# Patient Record
Sex: FEMALE | Race: BLACK OR AFRICAN AMERICAN | NOT HISPANIC OR LATINO
[De-identification: names, ages, dates, MRNs, and addresses within clinical notes are randomized per-mention and may not be internally consistent; named-entity substitution may affect disease eponyms.]

---

## 2021-10-08 PROBLEM — Z00.00 ENCOUNTER FOR PREVENTIVE HEALTH EXAMINATION: Status: ACTIVE | Noted: 2021-10-08

## 2021-10-12 ENCOUNTER — APPOINTMENT (OUTPATIENT)
Dept: INFECTIOUS DISEASE | Facility: CLINIC | Age: 36
End: 2021-10-12
Payer: MEDICAID

## 2021-10-12 ENCOUNTER — LABORATORY RESULT (OUTPATIENT)
Age: 36
End: 2021-10-12

## 2021-10-12 VITALS
TEMPERATURE: 97.6 F | HEART RATE: 79 BPM | HEIGHT: 64 IN | SYSTOLIC BLOOD PRESSURE: 119 MMHG | WEIGHT: 154.38 LBS | OXYGEN SATURATION: 98 % | BODY MASS INDEX: 26.36 KG/M2 | DIASTOLIC BLOOD PRESSURE: 80 MMHG

## 2021-10-12 DIAGNOSIS — Z86.2 PERSONAL HISTORY OF DISEASES OF THE BLOOD AND BLOOD-FORMING ORGANS AND CERTAIN DISORDERS INVOLVING THE IMMUNE MECHANISM: ICD-10-CM

## 2021-10-12 PROCEDURE — 99203 OFFICE O/P NEW LOW 30 MIN: CPT

## 2021-10-12 RX ORDER — VITAMIN E ACETATE 670 MG
CAPSULE ORAL
Refills: 0 | Status: ACTIVE | COMMUNITY

## 2021-10-12 NOTE — DATA REVIEWED
[FreeTextEntry1] : 9/1/21 lab notable for\par GC chlamydia neg\par HBsAg+\par HCV ab neg\par RPR neg\par HIV neg \par HSV1 IgG pos\par HSV2 IgG pos\par \par Referred by GYN Dr. Paola Herbert ([phone])\par

## 2021-10-12 NOTE — PHYSICAL EXAM
[General Appearance - Alert] : alert [General Appearance - In No Acute Distress] : in no acute distress [Sclera] : the sclera and conjunctiva were normal [Outer Ear] : the ears and nose were normal in appearance [Neck Appearance] : the appearance of the neck was normal [Auscultation Breath Sounds / Voice Sounds] : lungs were clear to auscultation bilaterally [Heart Rate And Rhythm] : heart rate was normal and rhythm regular [Heart Sounds] : normal S1 and S2 [Abdomen Soft] : soft [] : no hepato-splenomegaly [Skin Color & Pigmentation] : normal skin color and pigmentation [No Focal Deficits] : no focal deficits [Oriented To Time, Place, And Person] : oriented to person, place, and time

## 2021-10-12 NOTE — HISTORY OF PRESENT ILLNESS
[Monogamous] : monogamous [FreeTextEntry1] : 35F h/o NELA p/w initial evaluation for hepatitis B infection.\par \par She was tested positive for hepatitis B in 2011 when she was pregnant.  She didn't get much explanation what to do afterword and wasn't referred to anyone.  She didn't think the result is significant and forgot about it.  She does remember that her baby was vaccinated right after the birth to get protected.   She went to see GYN Dr. Paola Herbert for routine screening in 9/2021 and was told that she has hepatitis B infection and was referred to see ID.  Currently she is asymptomatic. \par \par Of note, she has L ear infection and is taking ear drop abx.   [Sexually Active] : The patient is not sexually active [Condom Use] : not using condoms [de-identified] : last sexual activity was in 2019.  Had monogomous relationship (male partner) for 8 years which ended in 2019.   [de-identified] : none [de-identified] : Born in [facility], moved to [facility] at age 6, then moved to the [place] at age 13.  Since then she was at foster care, doesn't know anything about her biological parents   [de-identified] :  aid  [de-identified] : none now [de-identified] : daughter (10 yo)

## 2021-10-13 LAB
ALBUMIN SERPL ELPH-MCNC: 4.9 G/DL
ALP BLD-CCNC: 55 U/L
ALT SERPL-CCNC: 12 U/L
ANION GAP SERPL CALC-SCNC: 14 MMOL/L
AST SERPL-CCNC: 16 U/L
BILIRUB SERPL-MCNC: 0.4 MG/DL
BUN SERPL-MCNC: 7 MG/DL
CALCIUM SERPL-MCNC: 10.2 MG/DL
CHLORIDE SERPL-SCNC: 103 MMOL/L
CO2 SERPL-SCNC: 24 MMOL/L
CREAT SERPL-MCNC: 0.8 MG/DL
FERRITIN SERPL-MCNC: 42 NG/ML
GLUCOSE SERPL-MCNC: 94 MG/DL
HBV E AB SER QL: REACTIVE
HBV E AG SER QL: NONREACTIVE
HIV1+2 AB SPEC QL IA.RAPID: NONREACTIVE
INR PPP: 1.06 RATIO
IRON SATN MFR SERPL: 8 %
IRON SERPL-MCNC: 28 UG/DL
POTASSIUM SERPL-SCNC: 3.8 MMOL/L
PROT SERPL-MCNC: 8 G/DL
PT BLD: 12.6 SEC
SODIUM SERPL-SCNC: 141 MMOL/L
TIBC SERPL-MCNC: 355 UG/DL
UIBC SERPL-MCNC: 327 UG/DL

## 2021-10-14 LAB
BASOPHILS # BLD AUTO: 0.01 K/UL
BASOPHILS NFR BLD AUTO: 0.4 %
EOSINOPHIL # BLD AUTO: 0.01 K/UL
EOSINOPHIL NFR BLD AUTO: 0.4 %
HAV IGM SER QL: NONREACTIVE
HBV CORE IGG+IGM SER QL: REACTIVE
HBV CORE IGM SER QL: NONREACTIVE
HBV DNA # SERPL NAA+PROBE: 201 IU/ML
HBV SURFACE AB SER QL: ABNORMAL
HBV SURFACE AB SERPL IA-ACNC: 9.1 MIU/ML
HBV SURFACE AG SER QL: REACTIVE
HCT VFR BLD CALC: 40 %
HCV AB SER QL: NONREACTIVE
HCV S/CO RATIO: 0.16 S/CO
HEPATITIS A IGG ANTIBODY: REACTIVE
HEPB DNA PCR LOG: 2.3 LOG10IU/ML
HGB BLD-MCNC: 12.8 G/DL
IMM GRANULOCYTES NFR BLD AUTO: 0 %
LYMPHOCYTES # BLD AUTO: 1.11 K/UL
LYMPHOCYTES NFR BLD AUTO: 41.3 %
MAN DIFF?: NORMAL
MCHC RBC-ENTMCNC: 27.3 PG
MCHC RBC-ENTMCNC: 32 GM/DL
MCV RBC AUTO: 85.3 FL
MONOCYTES # BLD AUTO: 0.28 K/UL
MONOCYTES NFR BLD AUTO: 10.4 %
NEUTROPHILS # BLD AUTO: 1.28 K/UL
NEUTROPHILS NFR BLD AUTO: 47.5 %
PLATELET # BLD AUTO: 204 K/UL
RBC # BLD: 4.69 M/UL
RBC # FLD: 14.6 %
WBC # FLD AUTO: 2.69 K/UL

## 2021-10-15 LAB
FIBROSIS SCORE: 0.1
FIBROSIS STAGE: NORMAL
FIBROSURE ALPHA 2 MACROGLOBULINS: 204 MG/DL
FIBROSURE ALT (SGPT): 14 IU/L
FIBROSURE APOLIPOPROTEIN A1: 130 MG/DL
FIBROSURE COMMENT 2: NORMAL
FIBROSURE GGT: 15 IU/L
FIBROSURE HAPTOGLOBIN: 120 MG/DL
FIBROSURE INTERPRETATIONS: NORMAL
FIBROSURE LIMITATIONS: NORMAL
FIBROSURE NECROINFLAMM ACTIVITY SCORING: NORMAL
FIBROSURE NECROINFLAMMAT ACTIVITY GRPFIBROSURE NECROINFLAMMA: NORMAL
FIBROSURE NECROINFLAMMAT ACTIVITY SCORE: 0.03
FIBROSURE SCORING: NORMAL
FIBROSURE TOTAL BILIRUBIN: 0.4 MG/DL

## 2021-10-21 LAB — HDV AB SER IA-ACNC: NEGATIVE

## 2021-11-09 ENCOUNTER — APPOINTMENT (OUTPATIENT)
Dept: INFECTIOUS DISEASE | Facility: CLINIC | Age: 36
End: 2021-11-09

## 2022-03-07 ENCOUNTER — NON-APPOINTMENT (OUTPATIENT)
Age: 37
End: 2022-03-07

## 2022-03-17 ENCOUNTER — APPOINTMENT (OUTPATIENT)
Dept: INFECTIOUS DISEASE | Facility: CLINIC | Age: 37
End: 2022-03-17
Payer: MEDICAID

## 2022-03-17 VITALS
TEMPERATURE: 99.4 F | OXYGEN SATURATION: 97 % | WEIGHT: 155.38 LBS | SYSTOLIC BLOOD PRESSURE: 113 MMHG | HEIGHT: 64 IN | BODY MASS INDEX: 26.53 KG/M2 | HEART RATE: 91 BPM | DIASTOLIC BLOOD PRESSURE: 77 MMHG

## 2022-03-17 DIAGNOSIS — B19.10 UNSPECIFIED VIRAL HEPATITIS B W/OUT HEPATIC COMA: ICD-10-CM

## 2022-03-17 PROCEDURE — 99214 OFFICE O/P EST MOD 30 MIN: CPT

## 2022-03-22 LAB
ALBUMIN SERPL ELPH-MCNC: 4.7 G/DL
ALP BLD-CCNC: 42 U/L
ALT SERPL-CCNC: 10 U/L
ANION GAP SERPL CALC-SCNC: 12 MMOL/L
AST SERPL-CCNC: 16 U/L
BASOPHILS # BLD AUTO: 0.02 K/UL
BASOPHILS NFR BLD AUTO: 0.6 %
BILIRUB SERPL-MCNC: 0.6 MG/DL
BUN SERPL-MCNC: 9 MG/DL
CALCIUM SERPL-MCNC: 9.5 MG/DL
CHLORIDE SERPL-SCNC: 105 MMOL/L
CO2 SERPL-SCNC: 22 MMOL/L
CREAT SERPL-MCNC: 0.88 MG/DL
EGFR: 88 ML/MIN/1.73M2
EOSINOPHIL # BLD AUTO: 0.03 K/UL
EOSINOPHIL NFR BLD AUTO: 0.9 %
GLUCOSE SERPL-MCNC: 90 MG/DL
HBV DNA # SERPL NAA+PROBE: 215 IU/ML
HCT VFR BLD CALC: 38.7 %
HEPB DNA PCR INT: DETECTED
HEPB DNA PCR LOG: 2.33 LOGIU/ML
HGB BLD-MCNC: 12.3 G/DL
IMM GRANULOCYTES NFR BLD AUTO: 0.3 %
LYMPHOCYTES # BLD AUTO: 1.54 K/UL
LYMPHOCYTES NFR BLD AUTO: 43.9 %
MAN DIFF?: NORMAL
MCHC RBC-ENTMCNC: 26.7 PG
MCHC RBC-ENTMCNC: 31.8 GM/DL
MCV RBC AUTO: 83.9 FL
MONOCYTES # BLD AUTO: 0.33 K/UL
MONOCYTES NFR BLD AUTO: 9.4 %
NEUTROPHILS # BLD AUTO: 1.58 K/UL
NEUTROPHILS NFR BLD AUTO: 44.9 %
PLATELET # BLD AUTO: 221 K/UL
POTASSIUM SERPL-SCNC: 4.1 MMOL/L
PROT SERPL-MCNC: 7.5 G/DL
RBC # BLD: 4.61 M/UL
RBC # FLD: 13 %
SODIUM SERPL-SCNC: 139 MMOL/L
WBC # FLD AUTO: 3.51 K/UL

## 2022-03-22 NOTE — HISTORY OF PRESENT ILLNESS
[Monogamous] : monogamous [FreeTextEntry1] : 35F h/o NELA, chronic HBV p/w management of chronic HBV. \par \par Patient is asymptomatic.  Came here to discuss the result.   [Sexually Active] : The patient is not sexually active [Condom Use] : not using condoms [de-identified] : last sexual activity was in 2019.  Had monogomous relationship (male partner) for 8 years which ended in 2019.   [de-identified] : none [de-identified] : Born in [facility], moved to [facility] at age 6, then moved to the [place] at age 13.  Since then she was at foster care, doesn't know anything about her biological parents   [de-identified] :  aid  [de-identified] : none now [de-identified] : daughter (10 yo)

## 2022-03-22 NOTE — ASSESSMENT
[FreeTextEntry1] : 35F h/o NELA, chronic HBV p/w management of chronic HBV. \par \par HBsAg+, HBeAb+, HBV , normal LFT.  No indication for treatment.  She hasn't gotten liver US.  Will ask her to get it.\par \par Unclear cause of leukopenia.  Can be due to genetic from  origin. Will repeat and will discuss with Dr. Daley to see if she needs to be referred to heme.\par \par - HBV PCR, CMP, CBC \par - US RUQ\par - RTC 3 months\par \par

## 2022-03-22 NOTE — DATA REVIEWED
[FreeTextEntry1] : 10/12/21 lab notable for\par HIV -\par HBeAg-, HBeAb+, HBsAg+, HBsAb+\par HBV \par HCV neg\par Fibrosure F0 - no fibrosis \par 2.69>12.8<204\par CMP normal\par \par \par 9/1/21 lab notable for\par GC chlamydia neg\par HBsAg+\par HCV ab neg\par RPR neg\par HIV neg \par HSV1 IgG pos\par HSV2 IgG pos\par \par Referred by GYN Dr. Paola Herbert ([phone])\par

## 2022-04-19 ENCOUNTER — APPOINTMENT (OUTPATIENT)
Dept: HEMATOLOGY ONCOLOGY | Facility: CLINIC | Age: 37
End: 2022-04-19
Payer: MEDICAID

## 2022-04-19 VITALS
OXYGEN SATURATION: 97 % | DIASTOLIC BLOOD PRESSURE: 57 MMHG | SYSTOLIC BLOOD PRESSURE: 111 MMHG | BODY MASS INDEX: 27.31 KG/M2 | HEART RATE: 66 BPM | WEIGHT: 160 LBS | HEIGHT: 64 IN | TEMPERATURE: 97.3 F

## 2022-04-19 DIAGNOSIS — D72.819 DECREASED WHITE BLOOD CELL COUNT, UNSPECIFIED: ICD-10-CM

## 2022-04-19 DIAGNOSIS — R79.89 OTHER SPECIFIED ABNORMAL FINDINGS OF BLOOD CHEMISTRY: ICD-10-CM

## 2022-04-19 PROCEDURE — 99204 OFFICE O/P NEW MOD 45 MIN: CPT | Mod: 25

## 2022-04-19 PROCEDURE — 99203 OFFICE O/P NEW LOW 30 MIN: CPT | Mod: 25

## 2022-04-19 RX ORDER — ASCORBIC ACID 500 MG
TABLET ORAL
Refills: 0 | Status: ACTIVE | COMMUNITY

## 2022-04-20 PROBLEM — R79.89 LOW VITAMIN D LEVEL: Status: ACTIVE | Noted: 2022-04-20

## 2022-05-03 ENCOUNTER — TRANSCRIPTION ENCOUNTER (OUTPATIENT)
Age: 37
End: 2022-05-03

## 2022-05-09 ENCOUNTER — NON-APPOINTMENT (OUTPATIENT)
Age: 37
End: 2022-05-09

## 2022-05-16 LAB
25(OH)D3 SERPL-MCNC: 21.6 NG/ML
ANACR T: NEGATIVE
BASOPHILS # BLD AUTO: 0.02 K/UL
BASOPHILS NFR BLD AUTO: 0.6 %
EOSINOPHIL # BLD AUTO: 0.02 K/UL
EOSINOPHIL NFR BLD AUTO: 0.6 %
HCT VFR BLD CALC: 34.4 %
HGB BLD-MCNC: 11.1 G/DL
IMM GRANULOCYTES NFR BLD AUTO: 0 %
LYMPHOCYTES # BLD AUTO: 1.59 K/UL
LYMPHOCYTES NFR BLD AUTO: 49.2 %
MAN DIFF?: NORMAL
MCHC RBC-ENTMCNC: 26.9 PG
MCHC RBC-ENTMCNC: 32.3 GM/DL
MCV RBC AUTO: 83.5 FL
MONOCYTES # BLD AUTO: 0.26 K/UL
MONOCYTES NFR BLD AUTO: 8 %
NEUTROPHILS # BLD AUTO: 1.34 K/UL
NEUTROPHILS NFR BLD AUTO: 41.6 %
PLATELET # BLD AUTO: 217 K/UL
RBC # BLD: 4.12 M/UL
RBC # FLD: 13.6 %
TSH SERPL-ACNC: 2.4 UIU/ML
VIT B12 SERPL-MCNC: 977 PG/ML
WBC # FLD AUTO: 3.23 K/UL

## 2022-05-16 NOTE — ASSESSMENT
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

## 2022-05-16 NOTE — HISTORY OF PRESENT ILLNESS
[de-identified] : 36 years old [place] female was found to have a low white blood count... Patient has hepatitis B infection...\par \par During her pregnancy 11 years ago she was told she has low iron but did not know about low white blood count... Patient states "I hardly gets sick"...

## 2022-05-16 NOTE — CONSULT LETTER
[Dear  ___] : Dear  [unfilled], [Consult Letter:] : I had the pleasure of evaluating your patient, [unfilled]. [Please see my note below.] : Please see my note below. [Consult Closing:] : Thank you very much for allowing me to participate in the care of this patient.  If you have any questions, please do not hesitate to contact me. [Sincerely,] : Sincerely, [FreeTextEntry3] : Ellie Daley MD\par  [DrLori  ___] : Dr. ALEJANDRO

## 2022-06-16 ENCOUNTER — APPOINTMENT (OUTPATIENT)
Dept: INFECTIOUS DISEASE | Facility: CLINIC | Age: 37
End: 2022-06-16